# Patient Record
Sex: MALE | Race: WHITE | Employment: UNEMPLOYED | URBAN - METROPOLITAN AREA
[De-identification: names, ages, dates, MRNs, and addresses within clinical notes are randomized per-mention and may not be internally consistent; named-entity substitution may affect disease eponyms.]

---

## 2021-05-21 ENCOUNTER — APPOINTMENT (OUTPATIENT)
Dept: GENERAL RADIOLOGY | Age: 51
DRG: 641 | End: 2021-05-21
Attending: EMERGENCY MEDICINE

## 2021-05-21 ENCOUNTER — HOSPITAL ENCOUNTER (INPATIENT)
Age: 51
LOS: 1 days | Discharge: HOME | DRG: 641 | End: 2021-05-22
Attending: EMERGENCY MEDICINE | Admitting: INTERNAL MEDICINE

## 2021-05-21 DIAGNOSIS — A41.9 SEPSIS, DUE TO UNSPECIFIED ORGANISM, UNSPECIFIED WHETHER ACUTE ORGAN DYSFUNCTION PRESENT (HCC): ICD-10-CM

## 2021-05-21 DIAGNOSIS — R55 SYNCOPE, UNSPECIFIED SYNCOPE TYPE: Primary | ICD-10-CM

## 2021-05-21 DIAGNOSIS — S83.104A ACUTE TRAUMATIC INTERNAL DERANGEMENT OF RIGHT KNEE, INITIAL ENCOUNTER: ICD-10-CM

## 2021-05-21 DIAGNOSIS — D72.829 LEUKOCYTOSIS, UNSPECIFIED TYPE: ICD-10-CM

## 2021-05-21 PROBLEM — E66.9 OBESITY: Status: ACTIVE | Noted: 2021-05-21

## 2021-05-21 PROBLEM — N17.9 AKI (ACUTE KIDNEY INJURY) (HCC): Status: ACTIVE | Noted: 2021-05-21

## 2021-05-21 PROBLEM — I10 HYPERTENSION: Status: ACTIVE | Noted: 2021-05-21

## 2021-05-21 PROBLEM — F41.9 ANXIETY DISORDER: Status: ACTIVE | Noted: 2021-05-21

## 2021-05-21 PROBLEM — I95.9 HYPOTENSION: Status: ACTIVE | Noted: 2021-05-21

## 2021-05-21 LAB
ABO + RH BLD: NORMAL
ALBUMIN SERPL-MCNC: 3.3 G/DL (ref 3.5–5)
ALBUMIN/GLOB SERPL: 0.8 {RATIO} (ref 1.2–3.5)
ALP SERPL-CCNC: 73 U/L (ref 50–136)
ALT SERPL-CCNC: 17 U/L (ref 12–65)
AMPHET UR QL SCN: NEGATIVE
ANION GAP SERPL CALC-SCNC: 5 MMOL/L (ref 7–16)
APPEARANCE UR: ABNORMAL
AST SERPL-CCNC: 12 U/L (ref 15–37)
ATRIAL RATE: 109 BPM
BACTERIA URNS QL MICRO: 0 /HPF
BARBITURATES UR QL SCN: NEGATIVE
BASOPHILS # BLD: 0.1 K/UL (ref 0–0.2)
BASOPHILS NFR BLD: 0 % (ref 0–2)
BENZODIAZ UR QL: NEGATIVE
BILIRUB SERPL-MCNC: 0.3 MG/DL (ref 0.2–1.1)
BILIRUB UR QL: NEGATIVE
BLOOD GROUP ANTIBODIES SERPL: NORMAL
BLOOD GROUP ANTIBODIES SERPL: NORMAL
BUN SERPL-MCNC: 14 MG/DL (ref 6–23)
CALCIUM SERPL-MCNC: 8.3 MG/DL (ref 8.3–10.4)
CALCULATED P AXIS, ECG09: 51 DEGREES
CALCULATED R AXIS, ECG10: -20 DEGREES
CALCULATED T AXIS, ECG11: 37 DEGREES
CANNABINOIDS UR QL SCN: NEGATIVE
CASTS URNS QL MICRO: ABNORMAL /LPF
CHLORIDE SERPL-SCNC: 108 MMOL/L (ref 98–107)
CO2 SERPL-SCNC: 26 MMOL/L (ref 21–32)
COCAINE UR QL SCN: NEGATIVE
COLOR UR: YELLOW
CREAT SERPL-MCNC: 1.55 MG/DL (ref 0.8–1.5)
D DIMER PPP FEU-MCNC: 2.58 UG/ML(FEU)
DIAGNOSIS, 93000: NORMAL
DIFFERENTIAL METHOD BLD: ABNORMAL
EOSINOPHIL # BLD: 0 K/UL (ref 0–0.8)
EOSINOPHIL NFR BLD: 0 % (ref 0.5–7.8)
EPI CELLS #/AREA URNS HPF: ABNORMAL /HPF
ERYTHROCYTE [DISTWIDTH] IN BLOOD BY AUTOMATED COUNT: 18.3 % (ref 11.9–14.6)
ETHANOL SERPL-MCNC: <3 MG/DL
GLOBULIN SER CALC-MCNC: 3.9 G/DL (ref 2.3–3.5)
GLUCOSE SERPL-MCNC: 123 MG/DL (ref 65–100)
GLUCOSE UR STRIP.AUTO-MCNC: 100 MG/DL
HCT VFR BLD AUTO: 32.9 % (ref 41.1–50.3)
HGB BLD-MCNC: 10 G/DL (ref 13.6–17.2)
HGB UR QL STRIP: NEGATIVE
IMM GRANULOCYTES # BLD AUTO: 0.1 K/UL (ref 0–0.5)
IMM GRANULOCYTES NFR BLD AUTO: 0 % (ref 0–5)
KETONES UR QL STRIP.AUTO: NEGATIVE MG/DL
LACTATE SERPL-SCNC: 2.5 MMOL/L (ref 0.4–2)
LACTATE SERPL-SCNC: 3.1 MMOL/L (ref 0.4–2)
LEUKOCYTE ESTERASE UR QL STRIP.AUTO: ABNORMAL
LYMPHOCYTES # BLD: 2.1 K/UL (ref 0.5–4.6)
LYMPHOCYTES NFR BLD: 9 % (ref 13–44)
MCH RBC QN AUTO: 24 PG (ref 26.1–32.9)
MCHC RBC AUTO-ENTMCNC: 30.4 G/DL (ref 31.4–35)
MCV RBC AUTO: 79.1 FL (ref 79.6–97.8)
METHADONE UR QL: NEGATIVE
MONOCYTES # BLD: 1.1 K/UL (ref 0.1–1.3)
MONOCYTES NFR BLD: 5 % (ref 4–12)
NEUTS SEG # BLD: 20.1 K/UL (ref 1.7–8.2)
NEUTS SEG NFR BLD: 86 % (ref 43–78)
NITRITE UR QL STRIP.AUTO: NEGATIVE
NRBC # BLD: 0 K/UL (ref 0–0.2)
OPIATES UR QL: NEGATIVE
P-R INTERVAL, ECG05: 134 MS
PCP UR QL: NEGATIVE
PH UR STRIP: 6 [PH] (ref 5–9)
PLATELET # BLD AUTO: 344 K/UL (ref 150–450)
PMV BLD AUTO: 9.6 FL (ref 9.4–12.3)
POTASSIUM SERPL-SCNC: 4.3 MMOL/L (ref 3.5–5.1)
PROCALCITONIN SERPL-MCNC: <0.05 NG/ML
PROT SERPL-MCNC: 7.2 G/DL (ref 6.3–8.2)
PROT UR STRIP-MCNC: NEGATIVE MG/DL
Q-T INTERVAL, ECG07: 318 MS
QRS DURATION, ECG06: 92 MS
QTC CALCULATION (BEZET), ECG08: 428 MS
RBC # BLD AUTO: 4.16 M/UL (ref 4.23–5.6)
RBC #/AREA URNS HPF: 0 /HPF
SODIUM SERPL-SCNC: 139 MMOL/L (ref 138–145)
SP GR UR REFRACTOMETRY: 1.01 (ref 1–1.02)
SPECIMEN EXP DATE BLD: NORMAL
TROPONIN-HIGH SENSITIVITY: 6.8 PG/ML (ref 0–14)
TROPONIN-HIGH SENSITIVITY: 7.3 PG/ML (ref 0–14)
UROBILINOGEN UR QL STRIP.AUTO: 0.2 EU/DL (ref 0.2–1)
VENTRICULAR RATE, ECG03: 109 BPM
WBC # BLD AUTO: 23.5 K/UL (ref 4.3–11.1)
WBC URNS QL MICRO: ABNORMAL /HPF

## 2021-05-21 PROCEDURE — 80307 DRUG TEST PRSMV CHEM ANLYZR: CPT

## 2021-05-21 PROCEDURE — 83605 ASSAY OF LACTIC ACID: CPT

## 2021-05-21 PROCEDURE — 82077 ASSAY SPEC XCP UR&BREATH IA: CPT

## 2021-05-21 PROCEDURE — 87040 BLOOD CULTURE FOR BACTERIA: CPT

## 2021-05-21 PROCEDURE — 65660000000 HC RM CCU STEPDOWN

## 2021-05-21 PROCEDURE — 74011250637 HC RX REV CODE- 250/637: Performed by: INTERNAL MEDICINE

## 2021-05-21 PROCEDURE — 96365 THER/PROPH/DIAG IV INF INIT: CPT

## 2021-05-21 PROCEDURE — 73610 X-RAY EXAM OF ANKLE: CPT

## 2021-05-21 PROCEDURE — 85379 FIBRIN DEGRADATION QUANT: CPT

## 2021-05-21 PROCEDURE — 74011000258 HC RX REV CODE- 258: Performed by: EMERGENCY MEDICINE

## 2021-05-21 PROCEDURE — 85025 COMPLETE CBC W/AUTO DIFF WBC: CPT

## 2021-05-21 PROCEDURE — 86870 RBC ANTIBODY IDENTIFICATION: CPT

## 2021-05-21 PROCEDURE — 73562 X-RAY EXAM OF KNEE 3: CPT

## 2021-05-21 PROCEDURE — 86901 BLOOD TYPING SEROLOGIC RH(D): CPT

## 2021-05-21 PROCEDURE — 99285 EMERGENCY DEPT VISIT HI MDM: CPT

## 2021-05-21 PROCEDURE — 81001 URINALYSIS AUTO W/SCOPE: CPT

## 2021-05-21 PROCEDURE — 84145 PROCALCITONIN (PCT): CPT

## 2021-05-21 PROCEDURE — 96361 HYDRATE IV INFUSION ADD-ON: CPT

## 2021-05-21 PROCEDURE — 96374 THER/PROPH/DIAG INJ IV PUSH: CPT

## 2021-05-21 PROCEDURE — 96360 HYDRATION IV INFUSION INIT: CPT

## 2021-05-21 PROCEDURE — 84484 ASSAY OF TROPONIN QUANT: CPT

## 2021-05-21 PROCEDURE — 74011250636 HC RX REV CODE- 250/636: Performed by: EMERGENCY MEDICINE

## 2021-05-21 PROCEDURE — 80053 COMPREHEN METABOLIC PANEL: CPT

## 2021-05-21 PROCEDURE — 71045 X-RAY EXAM CHEST 1 VIEW: CPT

## 2021-05-21 PROCEDURE — 74011250636 HC RX REV CODE- 250/636: Performed by: INTERNAL MEDICINE

## 2021-05-21 PROCEDURE — 93005 ELECTROCARDIOGRAM TRACING: CPT | Performed by: EMERGENCY MEDICINE

## 2021-05-21 RX ORDER — QUETIAPINE FUMARATE 25 MG/1
50 TABLET, FILM COATED ORAL 2 TIMES DAILY
Status: DISCONTINUED | OUTPATIENT
Start: 2021-05-21 | End: 2021-05-22 | Stop reason: HOSPADM

## 2021-05-21 RX ORDER — PROMETHAZINE HYDROCHLORIDE 25 MG/1
12.5 TABLET ORAL
Status: DISCONTINUED | OUTPATIENT
Start: 2021-05-21 | End: 2021-05-22 | Stop reason: HOSPADM

## 2021-05-21 RX ORDER — SODIUM CHLORIDE 0.9 % (FLUSH) 0.9 %
5-40 SYRINGE (ML) INJECTION AS NEEDED
Status: DISCONTINUED | OUTPATIENT
Start: 2021-05-21 | End: 2021-05-22 | Stop reason: HOSPADM

## 2021-05-21 RX ORDER — VANCOMYCIN 2 GRAM/500 ML IN 0.9 % SODIUM CHLORIDE INTRAVENOUS
2 ONCE
Status: DISCONTINUED | OUTPATIENT
Start: 2021-05-21 | End: 2021-05-21 | Stop reason: DRUGHIGH

## 2021-05-21 RX ORDER — DIVALPROEX SODIUM 500 MG/1
1500 TABLET, DELAYED RELEASE ORAL DAILY
Status: DISCONTINUED | OUTPATIENT
Start: 2021-05-22 | End: 2021-05-22 | Stop reason: HOSPADM

## 2021-05-21 RX ORDER — ACETAMINOPHEN 650 MG/1
650 SUPPOSITORY RECTAL
Status: DISCONTINUED | OUTPATIENT
Start: 2021-05-21 | End: 2021-05-22 | Stop reason: HOSPADM

## 2021-05-21 RX ORDER — SODIUM CHLORIDE 0.9 % (FLUSH) 0.9 %
5-40 SYRINGE (ML) INJECTION EVERY 8 HOURS
Status: DISCONTINUED | OUTPATIENT
Start: 2021-05-21 | End: 2021-05-22 | Stop reason: HOSPADM

## 2021-05-21 RX ORDER — SODIUM CHLORIDE 9 MG/ML
100 INJECTION, SOLUTION INTRAVENOUS CONTINUOUS
Status: DISCONTINUED | OUTPATIENT
Start: 2021-05-21 | End: 2021-05-22

## 2021-05-21 RX ORDER — ACETAMINOPHEN 325 MG/1
650 TABLET ORAL
Status: DISCONTINUED | OUTPATIENT
Start: 2021-05-21 | End: 2021-05-22 | Stop reason: HOSPADM

## 2021-05-21 RX ORDER — POLYETHYLENE GLYCOL 3350 17 G/17G
17 POWDER, FOR SOLUTION ORAL DAILY PRN
Status: DISCONTINUED | OUTPATIENT
Start: 2021-05-21 | End: 2021-05-22 | Stop reason: HOSPADM

## 2021-05-21 RX ORDER — OXYCODONE HYDROCHLORIDE 5 MG/1
5 TABLET ORAL ONCE
Status: COMPLETED | OUTPATIENT
Start: 2021-05-21 | End: 2021-05-21

## 2021-05-21 RX ORDER — ONDANSETRON 2 MG/ML
4 INJECTION INTRAMUSCULAR; INTRAVENOUS
Status: DISCONTINUED | OUTPATIENT
Start: 2021-05-21 | End: 2021-05-22 | Stop reason: HOSPADM

## 2021-05-21 RX ADMIN — PIPERACILLIN SODIUM AND TAZOBACTAM SODIUM 4.5 G: 4; .5 INJECTION, POWDER, LYOPHILIZED, FOR SOLUTION INTRAVENOUS at 11:08

## 2021-05-21 RX ADMIN — Medication 10 ML: at 14:09

## 2021-05-21 RX ADMIN — Medication 10 ML: at 22:21

## 2021-05-21 RX ADMIN — SODIUM CHLORIDE 100 ML/HR: 900 INJECTION, SOLUTION INTRAVENOUS at 14:09

## 2021-05-21 RX ADMIN — VANCOMYCIN HYDROCHLORIDE 2500 MG: 10 INJECTION, POWDER, LYOPHILIZED, FOR SOLUTION INTRAVENOUS at 11:51

## 2021-05-21 RX ADMIN — OXYCODONE 5 MG: 5 TABLET ORAL at 14:23

## 2021-05-21 RX ADMIN — QUETIAPINE FUMARATE 50 MG: 25 TABLET ORAL at 17:40

## 2021-05-21 RX ADMIN — SODIUM CHLORIDE, SODIUM LACTATE, POTASSIUM CHLORIDE, AND CALCIUM CHLORIDE 1000 ML: 600; 310; 30; 20 INJECTION, SOLUTION INTRAVENOUS at 11:10

## 2021-05-21 RX ADMIN — ACETAMINOPHEN 650 MG: 325 TABLET, FILM COATED ORAL at 23:21

## 2021-05-21 RX ADMIN — SODIUM CHLORIDE 1000 ML: 900 INJECTION, SOLUTION INTRAVENOUS at 11:52

## 2021-05-21 NOTE — PROGRESS NOTES
TRANSFER - IN REPORT:    Verbal report received from [facility] on Abdon Moreau  being received from ED for routine progression of care      Report consisted of patients Situation, Background, Assessment and   Recommendations(SBAR). Information from the following report(s) SBAR, Kardex, ED Summary, Procedure Summary, Intake/Output, MAR and Recent Results was reviewed with the receiving nurse. Opportunity for questions and clarification was provided. Assessment completed upon patients arrival to unit and care assumed.

## 2021-05-21 NOTE — ED TRIAGE NOTES
Pt states that he had two falls this morning, and that on one of these falls he heard a popping noise from his LLE, and later but before his syncopal episode, he was unable to ambulate.

## 2021-05-21 NOTE — PROGRESS NOTES
05/21/21 1322   Dual Skin Pressure Injury Assessment   Dual Skin Pressure Injury Assessment WDL   Second Care Provider (Based on [address] [place] [address]) Arabella Laboy   Skin Integumentary   Skin Integumentary (WDL) WDL    Pressure  Injury Documentation No Pressure Injury Noted-Pressure Ulcer Prevention Initiated   Skin Color Appropriate for ethnicity   Skin Condition/Temp Warm;Dry   Skin Integrity Intact   Turgor Non-tenting   Hair Growth Present   Varicosities Absent   Wound Prevention and Protection Methods   Orientation of Wound Prevention Posterior   Location of Wound Prevention Sacrum/Coccyx   Dressing Present  No   Wound Offloading (Prevention Methods) Bed, pressure reduction mattress;Pillows;Repositioning;Turning

## 2021-05-21 NOTE — ED PROVIDER NOTES
62-year-old male with history of hypertension, hx of previous CVA w/ residual left sided weakness, bipolar disorder, history of substance abuse, obesity, history of gastric sleeve, anemia presents via EMS from [employer] where he works status post witnessed syncopal episode. Patient reportedly felt dizzy and lightheaded and was lowered to the ground by a friend. EMS reports that patient was noted to be hypotensive with blood pressure 70s over 40s on arrival.  In route patient had additional 2 single doses and given 400 cc normal saline fluid. Denies abdominal pain, fever, chills, chest pain, shortness of breath, melena, hematochezia, hemoptysis, hematemesis, dysuria, hematuria, flank pain, headache, new focal weakness. Patient reports residual left-sided weakness at baseline. Patient denies hitting head or loss of conscious. Denies neck pain, back pain. The history is provided by the patient. No  was used. Syncope   This is a new problem. The current episode started less than 1 hour ago. The problem occurs rarely. The problem has been resolved. There was no loss of consciousness. Associated symptoms include dizziness and light-headedness. Pertinent negatives include no visual change, no chest pain, no palpitations, no diaphoresis, no fever, no abdominal pain, no bowel incontinence, no nausea, no bladder incontinence, no congestion, no headaches, no back pain, no seizures, no slurred speech, no melena and no head injury. He has tried nothing for the symptoms. The treatment provided no relief. His past medical history is significant for syncope.         Past Medical History:   Diagnosis Date    Aggressive outburst     Anxiety disorder     Bipolar 1 disorder, mixed ([facility])     Chronic pain     Depression     Hypertension     Ivone ([facility] [address]     Mood disorder ([facility])     Obesity     Psychiatric disorder     Sleep disorder     Substance abuse     Withdrawal syndrome ([facility] [address] Past Surgical History:   Procedure Laterality Date    HX CIRCUMCISION      HX GASTRIC BYPASS      HX OTHER SURGICAL  1999    vasectomy         Family History:   Problem Relation Age of Onset    Substance Abuse Brother        Social History     Socioeconomic History    Marital status: SINGLE     Spouse name: Not on file    Number of children: Not on file    Years of education: Not on file    Highest education level: Not on file   Occupational History    Not on file   Tobacco Use    Smoking status: Never Smoker    Smokeless tobacco: Current User   Substance and Sexual Activity    Alcohol use: Yes     Alcohol/week: 33.3 standard drinks     Types: 40 Cans of beer per week    Drug use: Yes     Types: Benzodiazepines    Sexual activity: Not Currently     Birth control/protection: Abstinence   Other Topics Concern    Not on file   Social History Narrative    Not on file     Social Determinants of Health     Financial Resource Strain:     Difficulty of Paying Living Expenses:    Food Insecurity:     Worried About Running Out of Food in the Last Year:     Ran Out of Food in the Last Year:    Transportation Needs:     Lack of Transportation (Medical):  Lack of Transportation (Non-Medical):    Physical Activity:     Days of Exercise per Week:     Minutes of Exercise per Session:    Stress:     Feeling of Stress :    Social Connections:     Frequency of Communication with Friends and Family:     Frequency of Social Gatherings with Friends and Family:     Attends Yazidi Services:     Active Member of Clubs or Organizations:     Attends Club or Organization Meetings:     Marital Status:    Intimate Partner Violence:     Fear of Current or Ex-Partner:     Emotionally Abused:     Physically Abused:     Sexually Abused: ALLERGIES: Patient has no known allergies. Review of Systems   Constitutional: Positive for fatigue. Negative for chills, diaphoresis and fever.    HENT: Negative for congestion, nosebleeds and sore throat. Eyes: Negative for photophobia and visual disturbance. Respiratory: Negative for cough and shortness of breath. Cardiovascular: Positive for syncope. Negative for chest pain and palpitations. Gastrointestinal: Negative for abdominal pain, bowel incontinence, constipation, diarrhea, melena and nausea. Genitourinary: Negative for bladder incontinence, dysuria, flank pain and hematuria. Musculoskeletal: Negative for back pain, gait problem, neck pain and neck stiffness. Skin: Positive for pallor. Negative for rash and wound. Neurological: Positive for dizziness, syncope and light-headedness. Negative for seizures, speech difficulty, numbness and headaches. Hematological: Does not bruise/bleed easily. Vitals:    05/21/21 0924   BP: (!) 91/53   Pulse: (!) 107   Temp: 98.6 °F (37 °C)   SpO2: 94%   Weight: 122.5 kg (270 lb)   Height: 5' 10\" (1.778 m)            Physical Exam  Vitals and nursing note reviewed. Constitutional:       Appearance: Normal appearance. HENT:      Head: Normocephalic and atraumatic. Comments: Atraumatic. No evidence of basilar skull fracture. Nose: Nose normal.      Mouth/Throat:      Comments: Moist.  Pale mucous membranes. Eyes:      Pupils: Pupils are equal, round, and reactive to light. Neck:      Comments: No midline C-spine tenderness. No step-off. Cardiovascular:      Rate and Rhythm: Regular rhythm. Tachycardia present. Pulses: Normal pulses. Heart sounds: Normal heart sounds. Comments: Pulses 2+ and equal throughout. Pulmonary:      Effort: Pulmonary effort is normal.      Breath sounds: Normal breath sounds. Abdominal:      General: Bowel sounds are normal.      Palpations: Abdomen is soft. Tenderness: There is no abdominal tenderness. There is no guarding or rebound. Comments: Soft, nontender, nondistended. No rebound or guarding. No peritoneal signs.   No pulsatile abdominal mass noted. Musculoskeletal:         General: Normal range of motion. Cervical back: Normal range of motion. Comments: Full range of motion of left knee. No crepitus. No deformity. Mild left ankle tenderness. No crepitus. No deformity. Skin:     Findings: No erythema or rash. Neurological:      Mental Status: He is alert and oriented to person, place, and time. Cranial Nerves: No cranial nerve deficit. Comments: No facial droop. No dysarthria. No aphasia. Patient with residual left-sided weakness. MDM  Number of Diagnoses or Management Options  Leukocytosis, unspecified type: new and requires workup  Sepsis, due to unspecified organism, unspecified whether acute organ dysfunction present [facility]): new and requires workup  Syncope, unspecified syncope type: new and requires workup  Diagnosis management comments: Patient hypotensive, tachycardic arrival.  O2 sat stable. Patient with significant leukocytosis with white blood cell count of 23.5. Lactic acid 2.5. Blood cultures obtained. Patient covered with vancomycin, Zosyn. Chest x-ray with no acute or concerning findings. X-ray left ankle with chronic appearing changes without acute abnormality. X-ray left knee with tricompartmental OA. No fracture or dislocation. Initial troponin unremarkable. EKG with sinus tachycardia. Labs D-dimer added on. Will consult hospitalist for admission for sepsis of unknown etiology. UA pending. Hospitalist consulted for admission.         Amount and/or Complexity of Data Reviewed  Clinical lab tests: ordered and reviewed  Tests in the radiology section of CPT®: ordered and reviewed  Tests in the medicine section of CPT®: ordered and reviewed  Review and summarize past medical records: yes  Discuss the patient with other providers: yes  Independent visualization of images, tracings, or specimens: yes    Risk of Complications, Morbidity, and/or Mortality  Presenting problems: high  Diagnostic procedures: high  Management options: high    Patient Progress  Patient progress: stable    ED Course as of May 21 1131   Fri May 21, 2021   0950 WBC(!): 23.5 [DF]   1012 CXR Findings: The lungs are clear. The mediastinal contour and osseous structures  are normal. No pneumothorax.     IMPRESSIONs: No acute findings.       [DF]   1057 Lactic acid(!): 2.5 [DF]   1100 XR L ankle    FINDINGS: There are vascular calcifications present. There is a plantar  calcaneal spur. There is degenerative change of the midfoot. No additional bony  abnormality.     IMPRESSION:   Chronic appearing change without acute abnormality. [DF]   1129 XR L knee IMPRESSION:   Tricompartmental OA change without joint effusion. [DF]      ED Course User Index  [DF] Letty Bonds MD       EKG    Date/Time: 5/21/2021 9:27 AM  Performed by: Letty Bonds MD  Authorized by: Letty Bonds MD     ECG reviewed by ED Physician in the absence of a cardiologist: yes    Rate:     ECG rate:  109    ECG rate assessment: tachycardic    Rhythm:     Rhythm: sinus tachycardia    Ectopy:     Ectopy: none    QRS:     QRS axis:  Normal    QRS intervals:  Normal  Conduction:     Conduction: normal    ST segments:     ST segments:  Normal  T waves:     T waves: normal          Results Include:    Recent Results (from the past 24 hour(s))   EKG, 12 LEAD, INITIAL    Collection Time: 05/21/21  9:23 AM   Result Value Ref Range    Ventricular Rate 109 BPM    Atrial Rate 109 BPM    P-R Interval 134 ms    QRS Duration 92 ms    Q-T Interval 318 ms    QTC Calculation (Bezet) 428 ms    Calculated P Axis 51 degrees    Calculated R Axis -20 degrees    Calculated T Axis 37 degrees    Diagnosis       !! AGE AND GENDER SPECIFIC ECG ANALYSIS !!   Sinus tachycardia  Otherwise normal ECG  No previous ECGs available     CBC WITH AUTOMATED DIFF    Collection Time: 05/21/21  9:25 AM   Result Value Ref Range    WBC 23.5 (H) 4.3 - 11.1 K/uL    RBC 4.16 (L) 4.23 - 5.6 M/uL    HGB 10.0 (L) 13.6 - 17.2 g/dL    HCT 32.9 (L) 41.1 - 50.3 %    MCV 79.1 (L) 79.6 - 97.8 FL    MCH 24.0 (L) 26.1 - 32.9 PG    MCHC 30.4 (L) 31.4 - 35.0 g/dL    RDW 18.3 (H) 11.9 - 14.6 %    PLATELET 626 104 - 516 K/uL    MPV 9.6 9.4 - 12.3 FL    ABSOLUTE NRBC 0.00 0.0 - 0.2 K/uL    DF AUTOMATED      NEUTROPHILS 86 (H) 43 - 78 %    LYMPHOCYTES 9 (L) 13 - 44 %    MONOCYTES 5 4.0 - 12.0 %    EOSINOPHILS 0 (L) 0.5 - 7.8 %    BASOPHILS 0 0.0 - 2.0 %    IMMATURE GRANULOCYTES 0 0.0 - 5.0 %    ABS. NEUTROPHILS 20.1 (H) 1.7 - 8.2 K/UL    ABS. LYMPHOCYTES 2.1 0.5 - 4.6 K/UL    ABS. MONOCYTES 1.1 0.1 - 1.3 K/UL    ABS. EOSINOPHILS 0.0 0.0 - 0.8 K/UL    ABS. BASOPHILS 0.1 0.0 - 0.2 K/UL    ABS. IMM. GRANS. 0.1 0.0 - 0.5 K/UL   METABOLIC PANEL, COMPREHENSIVE    Collection Time: 05/21/21  9:25 AM   Result Value Ref Range    Sodium 139 138 - 145 mmol/L    Potassium 4.3 3.5 - 5.1 mmol/L    Chloride 108 (H) 98 - 107 mmol/L    CO2 26 21 - 32 mmol/L    Anion gap 5 (L) 7 - 16 mmol/L    Glucose 123 (H) 65 - 100 mg/dL    BUN 14 6 - 23 MG/DL    Creatinine 1.55 (H) 0.8 - 1.5 MG/DL    GFR est AA >60 >60 ml/min/1.73m2    GFR est non-AA 51 (L) >60 ml/min/1.73m2    Calcium 8.3 8.3 - 10.4 MG/DL    Bilirubin, total 0.3 0.2 - 1.1 MG/DL    ALT (SGPT) 17 12 - 65 U/L    AST (SGOT) 12 (L) 15 - 37 U/L    Alk.  phosphatase 73 50 - 136 U/L    Protein, total 7.2 6.3 - 8.2 g/dL    Albumin 3.3 (L) 3.5 - 5.0 g/dL    Globulin 3.9 (H) 2.3 - 3.5 g/dL    A-G Ratio 0.8 (L) 1.2 - 3.5     PROCALCITONIN    Collection Time: 05/21/21  9:25 AM   Result Value Ref Range    Procalcitonin <0.05 ng/mL   ETHYL ALCOHOL    Collection Time: 05/21/21  9:25 AM   Result Value Ref Range    ALCOHOL(ETHYL),SERUM <3 MG/DL   TROPONIN-HIGH SENSITIVITY    Collection Time: 05/21/21  9:25 AM   Result Value Ref Range    Troponin-High Sensitivity 6.8 0 - 14 pg/mL   LACTIC ACID    Collection Time: 05/21/21 10:08 AM Result Value Ref Range    Lactic acid 2.5 (H) 0.4 - 2.0 MMOL/L           Demetrius David MD; 5/21/2021 @9:27 AM Voice dictation software was used during the making of this note. This software is not perfect and grammatical and other typographical errors may be present.   This note has not been proofread for errors.  ===================================================================

## 2021-05-21 NOTE — ED TRIAGE NOTES
Pt arrives via [employer] from [facility] where he works, with a CC of syncope. Pt found on the ground, reported lowered by friend. En route pt has had 2 syncopal episodes, given 400mL en route, with arrival pressure of 120/90. Gastric bypass pt with  Subsequent Hx of anemia. EMS reported peaked T-waves.

## 2021-05-21 NOTE — PROGRESS NOTES
Problem: Falls - Risk of  Goal: *Absence of Falls  Description: Document Maurice Mike Fall Risk and appropriate interventions in the flowsheet.   Outcome: Progressing Towards Goal  Note: Fall Risk Interventions:            Medication Interventions: Bed/chair exit alarm, Patient to call before getting OOB, Teach patient to arise slowly         History of Falls Interventions: Consult care management for discharge planning, Door open when patient unattended, Investigate reason for fall, Room close to nurse's station, Bed/chair exit alarm

## 2021-05-21 NOTE — PROGRESS NOTES
Chart review complete, CM met with pt at bedside pt found laying on stretcher alert and oriented x4, pt states he lives and works at [employer]. States independent with ADLS and drives, no current DME. Demographics and PCP with [facility] confirmed, states he had Medicaid in [place] prior to moving here, pt per notes has Medicaid for family planning. Self-pay packet with information for [facility] (pt already connected with them) as well as [facility] application and [employer] and [employer] cards provided to pt. Pt instructed CM staff will remain available to assist with dc needs at dc, pt verbalized understanding. Care Management Interventions  PCP Verified by CM:  Yes ([facility])  Discharge Durable Medical Equipment: No  Physical Therapy Consult: No  Occupational Therapy Consult: No  Speech Therapy Consult: No  Current Support Network: Shelter ([facility])  Confirm Follow Up Transport: Friends (Shelter )  Discharge Location  Discharge Placement: Other: (back to [employer])
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
## 2021-05-21 NOTE — ED NOTES
TRANSFER - OUT REPORT:    Verbal report given to Beth(name) on Josh Padilla  being transferred to 729(unit) for routine progression of care       Report consisted of patients Situation, Background, Assessment and   Recommendations(SBAR). Information from the following report(s) SBAR, ED Summary and Recent Results was reviewed with the receiving nurse. Lines:   Peripheral IV 05/21/21 Distal;Left Basilic (Active)       Peripheral IV 05/21/21 Anterior; Left Wrist (Active)       Peripheral IV 05/21/21 Anterior;Right Forearm (Active)        Opportunity for questions and clarification was provided.       Patient transported with:   [id number]

## 2021-05-22 ENCOUNTER — APPOINTMENT (OUTPATIENT)
Dept: GENERAL RADIOLOGY | Age: 51
DRG: 641 | End: 2021-05-22
Attending: INTERNAL MEDICINE

## 2021-05-22 VITALS
HEIGHT: 70 IN | RESPIRATION RATE: 18 BRPM | SYSTOLIC BLOOD PRESSURE: 147 MMHG | DIASTOLIC BLOOD PRESSURE: 95 MMHG | TEMPERATURE: 98.6 F | HEART RATE: 95 BPM | OXYGEN SATURATION: 98 % | BODY MASS INDEX: 38.65 KG/M2 | WEIGHT: 270 LBS

## 2021-05-22 PROBLEM — I95.9 HYPOTENSION: Status: RESOLVED | Noted: 2021-05-21 | Resolved: 2021-05-22

## 2021-05-22 PROBLEM — N17.9 AKI (ACUTE KIDNEY INJURY) (HCC): Status: RESOLVED | Noted: 2021-05-21 | Resolved: 2021-05-22

## 2021-05-22 PROBLEM — F41.9 ANXIETY DISORDER: Chronic | Status: ACTIVE | Noted: 2021-05-21

## 2021-05-22 PROBLEM — E66.9 OBESITY: Chronic | Status: ACTIVE | Noted: 2021-05-21

## 2021-05-22 PROBLEM — I10 HYPERTENSION: Chronic | Status: ACTIVE | Noted: 2021-05-21

## 2021-05-22 PROBLEM — R55 SYNCOPE: Status: RESOLVED | Noted: 2021-05-21 | Resolved: 2021-05-22

## 2021-05-22 PROBLEM — R55 SYNCOPE AND COLLAPSE: Status: RESOLVED | Noted: 2021-05-21 | Resolved: 2021-05-22

## 2021-05-22 LAB
ANION GAP SERPL CALC-SCNC: 7 MMOL/L (ref 7–16)
BASOPHILS # BLD: 0 K/UL (ref 0–0.2)
BASOPHILS NFR BLD: 0 % (ref 0–2)
BUN SERPL-MCNC: 9 MG/DL (ref 6–23)
CALCIUM SERPL-MCNC: 7.8 MG/DL (ref 8.3–10.4)
CHLORIDE SERPL-SCNC: 110 MMOL/L (ref 98–107)
CO2 SERPL-SCNC: 25 MMOL/L (ref 21–32)
CREAT SERPL-MCNC: 0.91 MG/DL (ref 0.8–1.5)
DIFFERENTIAL METHOD BLD: ABNORMAL
EOSINOPHIL # BLD: 0.1 K/UL (ref 0–0.8)
EOSINOPHIL NFR BLD: 1 % (ref 0.5–7.8)
ERYTHROCYTE [DISTWIDTH] IN BLOOD BY AUTOMATED COUNT: 18.5 % (ref 11.9–14.6)
GLUCOSE SERPL-MCNC: 103 MG/DL (ref 65–100)
HCT VFR BLD AUTO: 29.8 % (ref 41.1–50.3)
HGB BLD-MCNC: 8.7 G/DL (ref 13.6–17.2)
IMM GRANULOCYTES # BLD AUTO: 0 K/UL (ref 0–0.5)
IMM GRANULOCYTES NFR BLD AUTO: 0 % (ref 0–5)
LYMPHOCYTES # BLD: 1.7 K/UL (ref 0.5–4.6)
LYMPHOCYTES NFR BLD: 12 % (ref 13–44)
MCH RBC QN AUTO: 23.6 PG (ref 26.1–32.9)
MCHC RBC AUTO-ENTMCNC: 29.2 G/DL (ref 31.4–35)
MCV RBC AUTO: 81 FL (ref 79.6–97.8)
MONOCYTES # BLD: 0.4 K/UL (ref 0.1–1.3)
MONOCYTES NFR BLD: 3 % (ref 4–12)
NEUTS SEG # BLD: 11.5 K/UL (ref 1.7–8.2)
NEUTS SEG NFR BLD: 84 % (ref 43–78)
NRBC # BLD: 0 K/UL (ref 0–0.2)
PLATELET # BLD AUTO: 277 K/UL (ref 150–450)
PMV BLD AUTO: 9.7 FL (ref 9.4–12.3)
POTASSIUM SERPL-SCNC: 3.8 MMOL/L (ref 3.5–5.1)
RBC # BLD AUTO: 3.68 M/UL (ref 4.23–5.6)
SODIUM SERPL-SCNC: 142 MMOL/L (ref 138–145)
URATE SERPL-MCNC: 5.5 MG/DL (ref 2.6–6)
WBC # BLD AUTO: 13.7 K/UL (ref 4.3–11.1)

## 2021-05-22 PROCEDURE — 73560 X-RAY EXAM OF KNEE 1 OR 2: CPT

## 2021-05-22 PROCEDURE — 84550 ASSAY OF BLOOD/URIC ACID: CPT

## 2021-05-22 PROCEDURE — 80048 BASIC METABOLIC PNL TOTAL CA: CPT

## 2021-05-22 PROCEDURE — 85025 COMPLETE CBC W/AUTO DIFF WBC: CPT

## 2021-05-22 PROCEDURE — 74011250637 HC RX REV CODE- 250/637: Performed by: INTERNAL MEDICINE

## 2021-05-22 PROCEDURE — 36415 COLL VENOUS BLD VENIPUNCTURE: CPT

## 2021-05-22 RX ORDER — LISINOPRIL 20 MG/1
20 TABLET ORAL DAILY
Status: DISCONTINUED | OUTPATIENT
Start: 2021-05-22 | End: 2021-05-22 | Stop reason: HOSPADM

## 2021-05-22 RX ORDER — TRAMADOL HYDROCHLORIDE 50 MG/1
50 TABLET ORAL
Status: DISCONTINUED | OUTPATIENT
Start: 2021-05-22 | End: 2021-05-22 | Stop reason: HOSPADM

## 2021-05-22 RX ORDER — OXYCODONE HYDROCHLORIDE 5 MG/1
5 TABLET ORAL
Status: DISCONTINUED | OUTPATIENT
Start: 2021-05-22 | End: 2021-05-22

## 2021-05-22 RX ORDER — LISINOPRIL 20 MG/1
20 TABLET ORAL DAILY
COMMUNITY

## 2021-05-22 RX ORDER — GABAPENTIN 400 MG/1
400 CAPSULE ORAL 3 TIMES DAILY
Status: DISCONTINUED | OUTPATIENT
Start: 2021-05-22 | End: 2021-05-22 | Stop reason: HOSPADM

## 2021-05-22 RX ORDER — GABAPENTIN 400 MG/1
400 CAPSULE ORAL 3 TIMES DAILY
COMMUNITY

## 2021-05-22 RX ORDER — TRAMADOL HYDROCHLORIDE 50 MG/1
50 TABLET ORAL
Qty: 20 TABLET | Refills: 0 | Status: SHIPPED | OUTPATIENT
Start: 2021-05-22 | End: 2021-05-27

## 2021-05-22 RX ADMIN — GABAPENTIN 400 MG: 400 CAPSULE ORAL at 10:05

## 2021-05-22 RX ADMIN — TRAMADOL HYDROCHLORIDE 50 MG: 50 TABLET ORAL at 10:05

## 2021-05-22 RX ADMIN — LISINOPRIL 20 MG: 20 TABLET ORAL at 10:05

## 2021-05-22 RX ADMIN — Medication 10 ML: at 05:30

## 2021-05-22 RX ADMIN — QUETIAPINE FUMARATE 50 MG: 25 TABLET ORAL at 08:23

## 2021-05-22 RX ADMIN — DIVALPROEX SODIUM 1500 MG: 500 TABLET, DELAYED RELEASE ORAL at 08:23

## 2021-05-22 RX ADMIN — ACETAMINOPHEN 650 MG: 325 TABLET, FILM COATED ORAL at 08:23

## 2021-05-22 NOTE — ROUTINE PROCESS
Reviewed discharge paperwork and gave it to patient, gave prescriptions to pt, removed IVs, answered all questions at this time. Pt DC home with friend.

## 2021-05-22 NOTE — DISCHARGE SUMMARY
Hospitalist Discharge Summary     Admit Date:  2021  9:19 AM   DC note date: 2021  Name:  Sapphire Deleon   Age:  52 y.o.  :  1970   MRN:  [id number]   PCP:  None  Treatment Team: Attending Provider: Ronda Anne MD; Charge Nurse: Zara Kenney, RN; Primary Nurse: Estefany Sullivan RN  Presenting Complaint: Melena    Initial Admission Diagnosis: Syncope and collapse [R55]  Hypotension [I95.9]     Problem List for this Hospitalization:  Hospital Problems as of 2021 Never Reviewed        Codes Class Noted - Resolved POA    Obesity (Chronic) ICD-10-CM: E66.9  ICD-9-CM: 278.00  2021 - Present Yes        Hypertension (Chronic) ICD-10-CM: I10  ICD-9-CM: 401.9  2021 - Present Yes        Anxiety disorder (Chronic) ICD-10-CM: F41.9  ICD-9-CM: 300.00  2021 - Present Yes        Leukocytosis ICD-10-CM: D72.829  ICD-9-CM: 288.60  2021 - Present Yes        RESOLVED: Syncope ICD-10-CM: R55  ICD-9-CM: 780.2  2021 - 2021 Yes        * (Principal) RESOLVED: OLIVER (acute kidney injury) ([facility] ICD-10-CM: N17.9  ICD-9-CM: 584.9  2021 - 2021 Yes        RESOLVED: Syncope and collapse ICD-10-CM: R55  ICD-9-CM: 780.2  2021 - 2021 Yes        RESOLVED: Hypotension ICD-10-CM: I95.9  ICD-9-CM: 458.9  2021 - 2021 Yes        RESOLVED: Alcohol dependence ([facility] ICD-10-CM: F10.20  ICD-9-CM: 303.90  2012 - 2021                 Admission HPI from 2021:    \"  Sapphire Deleon is a 52 y.o. male who is brought to ER due to syncope and collapse this morning.      Patient has medical problems as mentioned below.      Patient thinks that he had problems last night with any signs of illness. He ate well. He slept well.      This morning, he woke up and was urinating in bathroom, he felt dizzy and passed out for a few seconds. He gained conciousness back. He got himself ready to work and went to work ( he stays in an apartment inside his workplace ).  At work, he had another episode of syncope. He lost conciousness again.       EMS was called. They found him with low BP. BP was in 70/40 on arrival. He was given IV fluid resuscitation which improved his symptoms.      No blood per rectum. No hematemesis. No fever. No shaking. No chills. Some cough. No phlegm.      His BP was improved to 110/70. He feels better in ER. Hospitalist service is requested to admit the patient. \"    Hospital Course:  Admitted for OLIVER, dehydration. labwork improved with IVF alone. Strong story for syncope related to dehydration. He denies drinking etoh. Encouraged to drink more fluids    Pt c/o knee pain. L knee XR done yesterday with OA. However he says his R knee is hurting and he may have twisted it. Exam unremarkable except for small abrasion anteriomedial aspect of knee where he said he hit it. Does not look infected. Uric acid wnl. XR with small effusion which could be trauma related. However if pain does not improve or has other signs/symptoms, develops mobility issues, may need MRI knee which should be done on non-emergent outpt basis with ortho follow up    Disposition: Home or Self Care  Activity: Activity as tolerated  Diet: DIET CARDIAC Regular  Code Status: Full Code    Time spent in patient discharge planning and coordination 35 minutes. Follow Up Orders:  No orders of the defined types were placed in this encounter. Follow-up Information     Follow up With Specialties Details Why Contact Info    Primary Care  Call As needed. please stay hydrated; drink plenty of fluids           Plan was discussed with pt. All questions answered. Patient was stable at time of discharge. Given instructions to call a physician or return if any concerns. Discharge summary and encounter summary was sent to PCP electronically via \"Comm Mgt\" link in [facility], if possible.     Discharge Info:   Current Discharge Medication List      START taking these medications Details   traMADoL (ULTRAM) 50 mg tablet Take 1 Tablet by mouth every six (6) hours as needed for Pain for up to 5 days. Max Daily Amount: 200 mg. Qty: 20 Tablet, Refills: 0  Start date: 5/22/2021, End date: 5/27/2021    Associated Diagnoses: Acute traumatic internal derangement of right knee, initial encounter         CONTINUE these medications which have NOT CHANGED    Details   lisinopriL (PRINIVIL, ZESTRIL) 20 mg tablet Take 20 mg by mouth daily. Indications: high blood pressure      gabapentin (NEURONTIN) 400 mg capsule Take 400 mg by mouth three (3) times daily. Indications: neuropathic pain      divalproex DR (DEPAKOTE) 500 mg tablet Take 1,500 mg by mouth daily. QUEtiapine (SEROQUEL) 100 mg tablet Take 50 mg by mouth two (2) times a day. CELECOXIB (CELEBREX PO) Take 1 Tab by mouth two (2) times a day. Procedures done this admission:  * No surgery found *    Consults this admission:  None    Echocardiogram/EKG results:  No results found for this visit on 05/21/21. Results for orders placed or performed during the hospital encounter of 05/21/21   EKG, 12 LEAD, INITIAL   Result Value Ref Range    Ventricular Rate 109 BPM    Atrial Rate 109 BPM    P-R Interval 134 ms    QRS Duration 92 ms    Q-T Interval 318 ms    QTC Calculation (Bezet) 428 ms    Calculated P Axis 51 degrees    Calculated R Axis -20 degrees    Calculated T Axis 37 degrees    Diagnosis       !! AGE AND GENDER SPECIFIC ECG ANALYSIS !! Sinus tachycardia  Otherwise normal ECG  No previous ECGs available  Confirmed by ST JEYSON CORTÉS MD (), TAPAN HATHAWAY ([id number]) on 5/21/2021 12:33:06 PM         Diagnostic Imaging/Tests:   XR ANKLE LT MIN 3 V    Result Date: 5/21/2021  History: Left ankle pain after fall 3 views left ankle FINDINGS: There are vascular calcifications present. There is a plantar calcaneal spur. There is degenerative change of the midfoot. No additional bony abnormality.      Chronic appearing change without acute abnormality. XR CHEST PORT    Result Date: 5/21/2021  History: Syncope Exam: portable chest Comparison: None Findings: The lungs are clear. The mediastinal contour and osseous structures are normal. No pneumothorax. IMPRESSIONs: No acute findings. XR KNEE LT 3 V    Result Date: 5/21/2021  History: Left knee pain EXAM: 3 views left knee FINDINGS: There is tricompartmental osteoarthritic change present. No joint effusion. No fracture or dislocation. Vascular calcifications noted. Tricompartmental OA change without joint effusion.       All Micro Results     Procedure Component Value Units Date/Time    BLOOD CULTURE [[id number]] Collected: 05/21/21 1008    Order Status: Completed Specimen: Blood Updated: 05/22/21 0715     Special Requests: --        LIGHT  ARM       Culture result: NO GROWTH AFTER 20 HOURS       BLOOD CULTURE [[id number]] Collected: 05/21/21 1204    Order Status: Completed Specimen: Blood Updated: 05/22/21 0715     Special Requests: --        RIGHT  Antecubital       Culture result: NO GROWTH AFTER 18 HOURS             SARS-CoV-2 Lab Results  \"Novel Coronavirus\" Test: No results found for: COV2NT   \"Emergent Disease\" Test: No results found for: EDPR  \"SARS-COV-2\" Test: No results found for: XGCOVT  Rapid Test: No results found for: COVR         Labs: Results:       BMP, Mg, Phos Recent Labs     05/22/21  0633 05/21/21  0925    139   K 3.8 4.3   * 108*   CO2 25 26   AGAP 7 5*   BUN 9 14   CREA 0.91 1.55*   CA 7.8* 8.3   * 123*      CBC Recent Labs     05/22/21  0633 05/21/21  0925   WBC 13.7* 23.5*   RBC 3.68* 4.16*   HGB 8.7* 10.0*   HCT 29.8* 32.9*    344   GRANS 84* 86*   LYMPH 12* 9*   EOS 1 0*   MONOS 3* 5   BASOS 0 0   IG 0 0   ANEU 11.5* 20.1*   ABL 1.7 2.1   SALINA 0.1 0.0   ABM 0.4 1.1   ABB 0.0 0.1   AIG 0.0 0.1      LFT Recent Labs     05/21/21  0925   ALT 17   AP 73   TP 7.2   ALB 3.3*   GLOB 3.9*   AGRAT 0.8*      Cardiac Testing No results found for: BNPP, BNP, CPK, RCK1, RCK2, RCK3, RCK4, CKMB, CKNDX, CKND1, TROPT, TROIQ   Coagulation Tests No results found for: PTP, INR, APTT, INREXT, INREXT   A1c No results found for: HBA1C, HGBE8, POH1SYMB, HCR0VWFG   Lipid Panel No results found for: CHOL, CHOLPOCT, CHOLX, CHLST, CHOLV, 833548, HDL, HDLP, LDL, LDLC, DLDLP, 815812, VLDLC, VLDL, TGLX, TRIGL, TRIGP, TGLPOCT, CH[facility], [facility]   Thyroid Panel Lab Results   Component Value Date/Time    TSH 2.48 01/09/2012 04:59 AM    TSH 1.46 12/05/2011 03:30 AM    T4, Total 8.4 01/09/2012 04:59 AM    T4, Total 9.4 12/05/2011 03:30 AM    T3 Uptake 37 01/09/2012 04:59 AM    T3 Uptake 36 12/05/2011 03:30 AM        Most Recent UA Lab Results   Component Value Date/Time    Color YELLOW 05/21/2021 02:11 PM    Appearance CLOUDY 05/21/2021 02:11 PM    pH (UA) 6.0 05/21/2021 02:11 PM    Protein Negative 05/21/2021 02:11 PM    Glucose 100 05/21/2021 02:11 PM    Ketone Negative 05/21/2021 02:11 PM    Bilirubin Negative 05/21/2021 02:11 PM    Blood Negative 05/21/2021 02:11 PM    Urobilinogen 0.2 05/21/2021 02:11 PM    Nitrites Negative 05/21/2021 02:11 PM    Leukocyte Esterase SMALL (A) 05/21/2021 02:11 PM    WBC 10-20 05/21/2021 02:11 PM    RBC 0 05/21/2021 02:11 PM    Epithelial cells 0-3 05/21/2021 02:11 PM    Bacteria 0 05/21/2021 02:11 PM    Casts 0-3 05/21/2021 02:11 PM          All Labs from Last 24 Hrs:  Recent Results (from the past 24 hour(s))   LACTIC ACID    Collection Time: 05/21/21 11:47 AM   Result Value Ref Range    Lactic acid 3.1 (H) 0.4 - 2.0 MMOL/L   CULTURE, BLOOD    Collection Time: 05/21/21 12:04 PM    Specimen: Blood   Result Value Ref Range    Special Requests: RIGHT  Antecubital        Culture result: NO GROWTH AFTER 18 HOURS     TROPONIN-HIGH SENSITIVITY    Collection Time: 05/21/21 12:04 PM   Result Value Ref Range    Troponin-High Sensitivity 7.3 0 - 14 pg/mL   DRUG SCREEN, URINE    Collection Time: 05/21/21  2:11 PM   Result Value Ref Range    PCP(PHENCYCLIDINE) Negative BENZODIAZEPINES Negative      COCAINE Negative      AMPHETAMINES Negative      METHADONE Negative      THC (TH-CANNABINOL) Negative      OPIATES Negative      BARBITURATES Negative     URINALYSIS W/ RFLX MICROSCOPIC    Collection Time: 05/21/21  2:11 PM   Result Value Ref Range    Color YELLOW      Appearance CLOUDY      Specific gravity 1.013 1.001 - 1.023      pH (UA) 6.0 5.0 - 9.0      Protein Negative NEG mg/dL    Glucose 100 mg/dL    Ketone Negative NEG mg/dL    Bilirubin Negative NEG      Blood Negative NEG      Urobilinogen 0.2 0.2 - 1.0 EU/dL    Nitrites Negative NEG      Leukocyte Esterase SMALL (A) NEG      WBC 10-20 0 /hpf    RBC 0 0 /hpf    Epithelial cells 0-3 0 /hpf    Bacteria 0 0 /hpf    Casts 0-3 0 /lpf   URIC ACID    Collection Time: 05/22/21  6:32 AM   Result Value Ref Range    Uric acid 5.5 2.6 - 6.0 MG/DL   METABOLIC PANEL, BASIC    Collection Time: 05/22/21  6:33 AM   Result Value Ref Range    Sodium 142 138 - 145 mmol/L    Potassium 3.8 3.5 - 5.1 mmol/L    Chloride 110 (H) 98 - 107 mmol/L    CO2 25 21 - 32 mmol/L    Anion gap 7 7 - 16 mmol/L    Glucose 103 (H) 65 - 100 mg/dL    BUN 9 6 - 23 MG/DL    Creatinine 0.91 0.8 - 1.5 MG/DL    GFR est AA >60 >60 ml/min/1.73m2    GFR est non-AA >60 >60 ml/min/1.73m2    Calcium 7.8 (L) 8.3 - 10.4 MG/DL   CBC WITH AUTOMATED DIFF    Collection Time: 05/22/21  6:33 AM   Result Value Ref Range    WBC 13.7 (H) 4.3 - 11.1 K/uL    RBC 3.68 (L) 4.23 - 5.6 M/uL    HGB 8.7 (L) 13.6 - 17.2 g/dL    HCT 29.8 (L) 41.1 - 50.3 %    MCV 81.0 79.6 - 97.8 FL    MCH 23.6 (L) 26.1 - 32.9 PG    MCHC 29.2 (L) 31.4 - 35.0 g/dL    RDW 18.5 (H) 11.9 - 14.6 %    PLATELET 339 386 - 315 K/uL    MPV 9.7 9.4 - 12.3 FL    ABSOLUTE NRBC 0.00 0.0 - 0.2 K/uL    DF AUTOMATED      NEUTROPHILS 84 (H) 43 - 78 %    LYMPHOCYTES 12 (L) 13 - 44 %    MONOCYTES 3 (L) 4.0 - 12.0 %    EOSINOPHILS 1 0.5 - 7.8 %    BASOPHILS 0 0.0 - 2.0 %    IMMATURE GRANULOCYTES 0 0.0 - 5.0 %    ABS.  NEUTROPHILS 11. 5 (H) 1.7 - 8.2 K/UL    ABS. LYMPHOCYTES 1.7 0.5 - 4.6 K/UL    ABS. MONOCYTES 0.4 0.1 - 1.3 K/UL    ABS. EOSINOPHILS 0.1 0.0 - 0.8 K/UL    ABS. BASOPHILS 0.0 0.0 - 0.2 K/UL    ABS. IMM. GRANS. 0.0 0.0 - 0.5 K/UL       Discharge Exam:  Patient Vitals for the past 24 hrs:   Temp Pulse Resp BP SpO2   05/22/21 0800 97.9 °F (36.6 °C) (!) 110 18 139/85 98 %   05/22/21 0400 97.9 °F (36.6 °C) 79 19 (!) 158/99 96 %   05/22/21 0000 97.7 °F (36.5 °C) 60 19 (!) 143/94 98 %   05/21/21 2043 98.2 °F (36.8 °C) 89 19 (!) 92/57 100 %   05/21/21 1605 98.5 °F (36.9 °C) 95 19 133/88 98 %   05/21/21 1329 97.8 °F (36.6 °C) 95 19 136/86 99 %   05/21/21 1229  94  107/64 100 %   05/21/21 1132  89  116/63 98 %   05/21/21 1129  89  117/63 98 %   05/21/21 1115  95  109/68 98 %   05/21/21 1013  (!) 103 18  97 %     Oxygen Therapy  O2 Sat (%): 98 % (05/22/21 0800)  Pulse via Oximetry: 94 beats per minute (05/21/21 1229)  O2 Device: None (Room air) (05/21/21 0924)    Estimated body mass index is 38.74 kg/m² as calculated from the following:    Height as of this encounter: 5' 10\" (1.778 m). Weight as of this encounter: 122.5 kg (270 lb). Intake/Output Summary (Last 24 hours) at 5/22/2021 1008  Last data filed at 5/22/2021 0630  Gross per 24 hour   Intake 2100 ml   Output 900 ml   Net 1200 ml       *Note that automatically entered I/Os may not be accurate; dependent on patient compliance with collection and accurate  by assistants. General:    Well nourished. No overt distress  Eyes:   Normal sclerae. Extraocular movements intact. ENT:  Normocephalic, atraumatic. Moist mucous membranes  CV:   Regular rate and rhythm. No edema. Lungs:  Even,  Unlabored  Abdomen:  nondistended. Extremities: Warm and dry. No cyanosis or clubbing  Neurologic: CN II-XII grossly intact. No gross focal deficits. Alert. Skin:     No rashes. No jaundice. Psych:  Normal mood and affect.     Current Med List in Hospital:   Current Facility-Administered Medications   Medication Dose Route Frequency    traMADoL (ULTRAM) tablet 50 mg  50 mg Oral Q4H PRN    lisinopriL (PRINIVIL, ZESTRIL) tablet 20 mg  20 mg Oral DAILY    gabapentin (NEURONTIN) capsule 400 mg  400 mg Oral TID    sodium chloride (NS) flush 5-40 mL  5-40 mL IntraVENous Q8H    sodium chloride (NS) flush 5-40 mL  5-40 mL IntraVENous PRN    acetaminophen (TYLENOL) tablet 650 mg  650 mg Oral Q6H PRN    Or    acetaminophen (TYLENOL) suppository 650 mg  650 mg Rectal Q6H PRN    polyethylene glycol (MIRALAX) packet 17 g  17 g Oral DAILY PRN    promethazine (PHENERGAN) tablet 12.5 mg  12.5 mg Oral Q6H PRN    Or    ondansetron (ZOFRAN) injection 4 mg  4 mg IntraVENous Q6H PRN    divalproex DR (DEPAKOTE) tablet 1,500 mg  1,500 mg Oral DAILY    QUEtiapine (SEROquel) tablet 50 mg  50 mg Oral BID       No Known Allergies  Immunization History   Administered Date(s) Administered    COVID-19, J&J, PF, 0.5 mL Dose 03/12/2021       Signed:  Tawanda Mcelory MD

## 2021-05-22 NOTE — PROGRESS NOTES
Pt is discharging home in stable condition. Pt has PCP with [employer], [place] application and [facility] cards provided to pt. Pt lives and works at [employer]; friend will transport him home. CM will vouch prescribed medications with [facility] voucher; informed Charge Nurse and updated pt.    1203-CM vouched pts Rx-$13.38. Prescription voucher given to charge nurse. No other d/c needs identified. Tx goals were met. Care Management Interventions  PCP Verified by CM:  Yes ([employer])  Mode of Transport at Discharge: [address] (CM Consult): Discharge Planning  Discharge Durable Medical Equipment: No  Physical Therapy Consult: No  Occupational Therapy Consult: No  Speech Therapy Consult: No  Current Support Network: Shelter (Lives and works at [employer])  Confirm Follow Up Transport: Self  The Plan for Transition of Care is Related to the Following Treatment Goals : Return home and back to his baseline  The Patient and/or Patient Representative was Provided with a Choice of Provider and Agrees with the Discharge Plan?: Yes  Name of the Patient Representative Who was Provided with a Choice of Provider and Agrees with the Discharge Plan: Patient  Freedom of Choice List was Provided with Basic Dialogue that Supports the Patient's Individualized Plan of Care/Goals, Treatment Preferences and Shares the Quality Data Associated with the Providers?: Yes  [employer] Information Provided?: No  Discharge Location  Discharge Placement: Shelter

## 2021-05-22 NOTE — PROGRESS NOTES
Problem: Falls - Risk of  Goal: *Absence of Falls  Description: Document Ramandeep Moya Fall Risk and appropriate interventions in the flowsheet.   Outcome: Progressing Towards Goal  Note: Fall Risk Interventions:  Mobility Interventions: Patient to call before getting OOB         Medication Interventions: Patient to call before getting OOB, Teach patient to arise slowly         History of Falls Interventions: Door open when patient unattended         Problem: Patient Education: Go to Patient Education Activity  Goal: Patient/Family Education  Outcome: Progressing Towards Goal     Problem: Syncope  Goal: *Absence of injury  Outcome: Progressing Towards Goal  Goal: Decrease or eliminate episodes of syncope  Outcome: Progressing Towards Goal     Problem: Patient Education: Go to Patient Education Activity  Goal: Patient/Family Education  Outcome: Progressing Towards Goal

## 2021-05-22 NOTE — PROGRESS NOTES
Problem: Falls - Risk of  Goal: *Absence of Falls  Description: Document Hernando Altamirano Fall Risk and appropriate interventions in the flowsheet.   Outcome: Progressing Towards Goal  Note: Fall Risk Interventions:            Medication Interventions: Bed/chair exit alarm, Patient to call before getting OOB         History of Falls Interventions: Bed/chair exit alarm, Door open when patient unattended         Problem: Patient Education: Go to Patient Education Activity  Goal: Patient/Family Education  Outcome: Progressing Towards Goal     Problem: Syncope  Goal: *Absence of injury  Outcome: Progressing Towards Goal  Goal: Decrease or eliminate episodes of syncope  Outcome: Progressing Towards Goal     Problem: Patient Education: Go to Patient Education Activity  Goal: Patient/Family Education  Outcome: Progressing Towards Goal

## 2021-05-26 LAB
BACTERIA SPEC CULT: NORMAL
BACTERIA SPEC CULT: NORMAL
SERVICE CMNT-IMP: NORMAL
SERVICE CMNT-IMP: NORMAL